# Patient Record
Sex: MALE | Race: BLACK OR AFRICAN AMERICAN | NOT HISPANIC OR LATINO | Employment: OTHER | URBAN - METROPOLITAN AREA
[De-identification: names, ages, dates, MRNs, and addresses within clinical notes are randomized per-mention and may not be internally consistent; named-entity substitution may affect disease eponyms.]

---

## 2019-10-24 ENCOUNTER — CLINICAL SUPPORT (OUTPATIENT)
Dept: DIABETES | Facility: CLINIC | Age: 34
End: 2019-10-24
Payer: COMMERCIAL

## 2019-10-24 DIAGNOSIS — R73.03 PRE-DIABETES: Primary | ICD-10-CM

## 2019-10-24 DIAGNOSIS — E66.3 OVERWEIGHT (BMI 25.0-29.9): ICD-10-CM

## 2019-10-24 PROCEDURE — 99999 PR PBB SHADOW E&M-NEW PATIENT-LVL II: CPT | Mod: PBBFAC,,, | Performed by: DIETITIAN, REGISTERED

## 2019-10-24 PROCEDURE — 99999 PR PBB SHADOW E&M-NEW PATIENT-LVL II: ICD-10-PCS | Mod: PBBFAC,,, | Performed by: DIETITIAN, REGISTERED

## 2019-10-24 PROCEDURE — G0108 DIAB MANAGE TRN  PER INDIV: HCPCS | Mod: S$GLB,,, | Performed by: DIETITIAN, REGISTERED

## 2019-10-24 PROCEDURE — G0108 PR DIAB MANAGE TRN  PER INDIV: ICD-10-PCS | Mod: S$GLB,,, | Performed by: DIETITIAN, REGISTERED

## 2019-10-25 VITALS — HEIGHT: 68 IN | WEIGHT: 190.88 LBS | BODY MASS INDEX: 28.93 KG/M2

## 2019-10-25 NOTE — PROGRESS NOTES
Diabetes Education  Author: Angélica Wynne RD  Date: 10/25/2019    Diabetes Care Management Summary  Diabetes Education Record Assessment/Progress: Initial  Current Diabetes Risk Level: Low         Diabetes Type  Diabetes Type : Pre-Diabetes    Diabetes History  Diabetes Diagnosis: 1-3 years  Current Treatment: Diet, Exercise  Reviewed Problem List with Patient: Yes    Health Maintenance was reviewed today with patient. Discussed with patient importance of routine eye exams, foot exams/foot care, blood work (i.e.: A1c, microalbumin, and lipid), dental visits, yearly flu vaccine, and pneumonia vaccine as indicated by PCP. Patient verbalized understanding.     The patient has no Health Maintenance topics of status Not Due  Health Maintenance Due   Topic Date Due    Lipid Panel  1985    TETANUS VACCINE  06/27/2003       Nutrition  Meal Planning: water, 3 meals per day, eats out seldom, snacks between meal  What type of sweetener do you use?: Stevia/Truvia  What type of beverages do you drink?: water  Meal Plan 24 Hour Recall - Breakfast: eggs or granola with fruit and almond milk  Meal Plan 24 Hour Recall - Lunch: (since moving back from [place] and getting a job, patient has been eating more and walking less. Patient has however started circuit training at a gym.)    Monitoring   Self Monitoring : not currently monitoring blood sugar  Blood Glucose Logs: No  Do you use a personal continuous glucose monitor?: No  In the last month, how often have you had a low blood sugar reaction?: never  What are your symptoms of low blood sugar?: shake, weak, sweaty  How do you treat low blood sugar?: eat something, drink juice  Can you tell when your blood sugar is too high?: no  How do you treat high blood sugar?: exercise, medication, sugar free beverages    Exercise   Exercise Type: use exercise equipment, exercise class  Intensity: High  Frequency: 3-5 Times per week  Duration: 1 hour    Current Diabetes Treatment    Current Treatment: Diet, Exercise    Social History  Preferred Learning Method: Face to Face, Demonstration, Hands On  Primary Support: Self, Spouse, Family  Educational Level: College Graduate  Occupation: patient is an actor currently driving for [employer]  Smoking Status: Never a Smoker  Alcohol Use: Monthly            DDS-2 Score  ( > 3 = SIGNIFICANT DISTRESS): 1                   Barriers to Change  Barriers to Change: None  Learning Challenges : None    Readiness to Learn   Readiness to Learn : Eager    Cultural Influences  Cultural Influences: No    Diabetes Education Assessment/Progress  Diabetes Disease Process (diabetes disease process and treatment options): Discussion, Instructed, Individual Session, Comprehends Key Points, Written Materials Provided  Nutrition (Incorporating nutritional management into one's lifestyle): Discussion, Instructed, Individual Session, Demonstration, Comprehends Key Points, Written Materials Provided  Physical Activity (incorporating physical activity into one's lifestyle): Discussion, Instructed, Individual Session, Demonstration, Comprehends Key Points, Written Materials Provided  Medications (states correct name, dose, onset, peak, duration, side effects & timing of meds): Discussion, Instructed, Individual Session, Comprehends Key Points, Written Materials Provided  Monitoring (monitoring blood glucose/other parameters & using results): Discussion, Instructed, Individual Session, Written Materials Provided, Comprehends Key Points  Acute Complications (preventing, detecting, and treating acute complications): Discussion, Instructed, Individual Session, Written Materials Provided, Comprehends Key Points  Chronic Complications (preventing, detecting, and treating chronic complications): Discussion, Instructed, Individual Session, Written Materials Provided, Comprehends Key Points  Clinical (diabetes, other pertinent medical history, and relevant comorbidities reviewed during  visit): Discussion, Instructed, Individual Session, Written Materials Provided, Comprehends Key Points  Cognitive (knowledge of self-management skills, functional health literacy): Comprehends Key Points, Individual Session, Instructed, Discussion, Written Materials Provided  Psychosocial (emotional response to diabetes): Comprehends Key Points, Individual Session, Instructed, Discussion, Written Materials Provided  Diabetes Distress and Support Systems: Comprehends Key Points, Individual Session, Instructed, Discussion, Written Materials Provided  Behavioral (readiness for change, lifestyle practices, self-care behaviors): Comprehends Key Points, Individual Session, Instructed, Discussion, Written Materials Provided  Patient educated on what is DM, T1DM, T2DM, risk factors, managing DM, DM diet, carbohydrate counting, meal planning, reading a food label, healthy snack options, benefits of physical activity, diabetes care schedule, foot care guidelines, diabetes and retinopathy screening, s/s hypo and hyperglycemia, long/short term complication of uncontrolled DM, importance of compliance with treatment plan, how to use a glucometer, reviewed understanding diabetes distress, medications for treating DM, their mechanism of action and possible side effects, reviewed current level and goal level for HgbA1c, blood glucose, microalbumin, and lipids. Patient provided with written literature, diabetes management resources and support, DM Management program contact information.    Goals  Patient has selected/evaluated goals during today's session: Yes, selected  Healthy Eating: Set  Start Date: 10/25/19  Target Date: 04/25/20  Physical Activity: Set  Start Date: 10/25/19  Target Date: 04/25/20  Reducing Risks: Set  Start Date: 10/25/19  Target Date: 04/25/20         Diabetes Care Plan/Intervention  Education Plan/Intervention: Individual Follow-Up DSMT    Diabetes Meal Plan  Restrictions: Low Fat, Low Sodium, Restricted  Carbohydrate  Calories: 1800  Carbohydrate Per Meal: 30-45g  Carbohydrate Per Snack : 7-15g  Fat: 50  Protein: 135    Today's Self-Management Care Plan was developed with the patient's input and is based on barriers identified during today's assessment.    The long and short-term goals in the care plan were written with the patient/caregiver's input. The patient has agreed to work toward these goals to improve his overall diabetes control.      The patient received a copy of today's self-management plan and verbalized understanding of the care plan, goals, and all of today's instructions.      The patient was encouraged to communicate with his physician and care team regarding his condition(s) and treatment.  I provided the patient with my contact information today and encouraged him to contact me via phone or patient portal as needed.     Education Units of Time   Time Spent: 60 min